# Patient Record
Sex: MALE | Race: WHITE
[De-identification: names, ages, dates, MRNs, and addresses within clinical notes are randomized per-mention and may not be internally consistent; named-entity substitution may affect disease eponyms.]

---

## 2019-09-30 ENCOUNTER — HOSPITAL ENCOUNTER (EMERGENCY)
Dept: HOSPITAL 7 - FB.ED | Age: 29
Discharge: HOME | End: 2019-09-30
Payer: COMMERCIAL

## 2019-09-30 DIAGNOSIS — Z91.040: ICD-10-CM

## 2019-09-30 DIAGNOSIS — L03.113: Primary | ICD-10-CM

## 2019-09-30 PROCEDURE — 87040 BLOOD CULTURE FOR BACTERIA: CPT

## 2019-09-30 PROCEDURE — 80048 BASIC METABOLIC PNL TOTAL CA: CPT

## 2019-09-30 PROCEDURE — 36415 COLL VENOUS BLD VENIPUNCTURE: CPT

## 2019-09-30 PROCEDURE — 85025 COMPLETE CBC W/AUTO DIFF WBC: CPT

## 2019-09-30 PROCEDURE — 99283 EMERGENCY DEPT VISIT LOW MDM: CPT

## 2019-09-30 NOTE — EDM.PDOC
ED HPI GENERAL MEDICAL PROBLEM





- General


Chief Complaint: General


Stated Complaint: POSSIBLE INFECTION


Time Seen by Provider: 09/30/19 20:24


Source of Information: Reports: Patient


History Limitations: Reports: No Limitations





- History of Present Illness


INITIAL COMMENTS - FREE TEXT/NARRATIVE: 





29 y.o.w.m was see in the clinic today where he  had a surgical procedure -boil 

lanced-at his left 2nd and 3rd finger. He was given Bactrim DS in order to 

cover MRSA. Pt noticed this pm. his left hand is swollen and a red streak is 

going up his right arm. FROM of left wrist arm, no Trauma. No N/V/D no SOB or 

any other acute med issues. /80 RR 18 Pulse ox 100% on RA Pulse 82 temp 

36.8


Onset Date: 09/30/19


Onset Time: 07:00


Duration: Hour(s):, Getting Worse


Location: Reports: Upper Extremity, Right


Quality: Reports: Dull


Severity: Mild


Improves with: Reports: Medication


Worsens with: Reports: Movement


Context: Reports: Other (surgical procedure right hand this am)


Associated Symptoms: Reports: Rash (right forearm)


  ** Right index finger


Pain Score (Numeric/FACES): 4





- Related Data


 Allergies











Allergy/AdvReac Type Severity Reaction Status Date / Time


 


Latex, Natural Rubber Allergy  Swelling Verified 09/30/19 19:42











Home Meds: 


 Home Meds





Amoxicillin/Potassium Clav [Augmentin 875-125 Tablet] 1 each PO BID #20 tablet 

09/30/19 [Rx]


Fluticasone/Salmeterol [Advair 250-50 Diskus] 1 puff INH ASDIRECTED 09/30/19 [

History]


Sulfamethoxazole/Trimethoprim [Bactrim Ds Tablet] 1 each PO BID 09/30/19 [

History]











Past Medical History


Psychiatric History: Reports: Anxiety, Depression





- Past Surgical History


Male  Surgical History: Reports: Mastectomy





Social & Family History





- Tobacco Use


Smoking Status *Q: Never Smoker





- Caffeine Use


Caffeine Use: Reports: Energy Drinks





- Recreational Drug Use


Recreational Drug Use: No





ED ROS GENERAL





- Review of Systems


Review Of Systems: See Below


Constitutional: Reports: No Symptoms


HEENT: Reports: No Symptoms


Respiratory: Reports: No Symptoms


Cardiovascular: Reports: No Symptoms


Endocrine: Reports: No Symptoms


GI/Abdominal: Reports: No Symptoms


: Reports: No Symptoms


Musculoskeletal: Reports: No Symptoms


Skin: Reports: Rash (right forearm)


Neurological: Reports: No Symptoms


Psychiatric: Reports: No Symptoms


Hematologic/Lymphatic: Reports: No Symptoms


Immunologic: Reports: No Symptoms





ED EXAM, GENERAL





- Physical Exam


Exam: See Below


Exam Limited By: No Limitations


General Appearance: Alert, WD/WN, Mild Distress


Eye Exam: Bilateral Eye: Normal Inspection


Ears: Normal External Exam


Ear Exam: Bilateral Ear: Auricle Normal


Nose: Normal Inspection, Normal Mucosa, No Blood


Throat/Mouth: Normal Inspection, Normal Lips, Normal Teeth, Normal Voice, No 

Airway Compromise


Head: Atraumatic, Normocephalic


Neck: Normal Inspection, Supple, Non-Tender, Full Range of Motion


Respiratory/Chest: No Respiratory Distress, Lungs Clear, Normal Breath Sounds, 

No Accessory Muscle Use, Chest Non-Tender


Cardiovascular: Normal Peripheral Pulses, Regular Rate, Rhythm, No Edema, No 

Gallop, No JVD, No Murmur, No Rub


Peripheral Pulses: 1+: Carotid (R)


GI/Abdominal: Normal Bowel Sounds, Soft, Non-Tender, No Organomegaly, No 

Abnormal Bruit, No Mass, Pelvis Stable


 (Male) Exam: Deferred


Rectal (Males) Exam: Deferred


Back Exam: Normal Inspection, Full Range of Motion


Extremities: Normal Range of Motion


Neurological: Alert, Oriented, CN II-XII Intact, Normal Cognition


Psychiatric: Normal Affect, Normal Mood


Skin Exam: Warm, Dry, Rash (right forearm)


Lymphatic: No Adenopathy





Course





- Vital Signs


Text/Narrative:: 





29 y.o.w.m was see in the clinic today where he  had a surgical procedure -boil 

lanced-at his left 2nd and 3rd finger. He was given Bactrim DS in order to 

cover MRSA. Pt noticed this pm. his left hand is swollen and a red streak is 

going up his right arm. FROM of left wrist arm, no Trauma. No N/V/D no SOB or 

any other acute med issues. /80 RR 18 Pulse ox 100% on RA Pulse 82 temp 

36.8


PE: WNWD W M with a read streak going up his right forearm


Labs: CBC, BMP all nl. Blood Cx results are pending


Imaging: Not indicated


Impression: Left arm cellulitis


Tx: Augmentin po


Reexam: Improved


Plan: D/C with instructions





Last Recorded V/S: 


 Last Vital Signs











Temp  36.7 C   09/30/19 20:30


 


Pulse  87   09/30/19 20:30


 


Resp  18   09/30/19 20:30


 


BP  132/88   09/30/19 20:30


 


Pulse Ox  95   09/30/19 20:30














- Orders/Labs/Meds


Orders: 


 Active Orders 24 hr











 Category Date Time Status


 


 CULTURE BLOOD [BC] Urgent Lab  09/30/19 20:11 Received


 


 CULTURE BLOOD [BC] Urgent Lab  09/30/19 20:19 Received


 


 Blood Culture x2 Reflex Set [OM.PC] Urgent Oth  09/30/19 19:52 Ordered











Labs: 


 Laboratory Tests











  09/30/19 09/30/19 Range/Units





  20:11 20:11 


 


WBC  11.5   (4.5-12.0)  X10-3/uL


 


RBC  5.26   (4.30-5.75)  x10(6)uL


 


Hgb  16.1   (13.5-17.8)  g/dL


 


Hct  47.0   (30.0-51.3)  %


 


MCV  89.3   (80-96)  fL


 


MCH  30.7   (27.7-33.6)  pg


 


MCHC  34.3   (32.2-35.4)  g/dL


 


RDW  12.7   (11.5-15.5)  %


 


Plt Count  295   (125-369)  X10(3)uL


 


MPV  8.0   (7.4-10.4)  fL


 


Neut % (Auto)  73.5   (46-82)  %


 


Lymph % (Auto)  16.4   (13-37)  %


 


Mono % (Auto)  7.2   (4-12)  %


 


Eos % (Auto)  2   (1.0-5.0)  %


 


Baso % (Auto)  1   (0-2)  %


 


Neut # (Auto)  8.5 H   (1.6-8.3)  #


 


Lymph # (Auto)  1.9   (0.6-5.0)  #


 


Mono # (Auto)  0.8   (0.0-1.3)  #


 


Eos # (Auto)  0.2   (0.0-0.8)  #


 


Baso # (Auto)  0.1   (0.0-0.2)  #


 


Sodium   139  (135-145)  mmol/L


 


Potassium   3.6  (3.5-5.3)  mmol/L


 


Chloride   103  (100-110)  mmol/L


 


Carbon Dioxide   28  (21-32)  mmol/L


 


BUN   9  (7-18)  mg/dL


 


Creatinine   1.0  (0.70-1.30)  mg/dL


 


Est Cr Clr Drug Dosing   107.03  mL/min


 


Estimated GFR (MDRD)   > 60  (>60)  


 


BUN/Creatinine Ratio   9.0  (9-20)  


 


Glucose   105  ()  mg/dL


 


Calcium   8.9  (8.6-10.2)  mg/dL











Meds: 


Medications














Discontinued Medications














Generic Name Dose Route Start Last Admin





  Trade Name Chilo  PRN Reason Stop Dose Admin


 


Amoxicillin/Clavulanate Potassium  1 tab  09/30/19 19:53  09/30/19 20:04





  Augmentin 875 Mg/125 Mg  PO  09/30/19 19:54  1 tab





  ONETIME STA   Administration





     





     





     





     














Departure





- Departure


Time of Disposition: 20:25


Disposition: Home, Self-Care 01


Condition: Good


Clinical Impression: 


Cellulitis


Qualifiers:


 Site of cellulitis of extremity: upper extremity Laterality: right 








- Discharge Information


Prescriptions: 


Amoxicillin/Potassium Clav [Augmentin 875-125 Tablet] 1 each PO BID #20 tablet


Instructions:  Cellulitis, Adult, Easy-to-Read


Referrals: 


Tiki Esposito PA-C [Primary Care Provider] - 


Forms:  ED Department Discharge


Additional Instructions: 


Please cont the Bactrim DS Abx, take Augmentin in addition as prescribed, 

please f/u, come back if your symptoms get worse acutely.





- My Orders


Last 24 Hours: 


My Active Orders





09/30/19 19:52


Blood Culture x2 Reflex Set [OM.PC] Urgent 





09/30/19 20:11


CULTURE BLOOD [BC] Urgent 





09/30/19 20:19


CULTURE BLOOD [BC] Urgent 














- Assessment/Plan


Last 24 Hours: 


My Active Orders





09/30/19 19:52


Blood Culture x2 Reflex Set [OM.PC] Urgent 





09/30/19 20:11


CULTURE BLOOD [BC] Urgent 





09/30/19 20:19


CULTURE BLOOD [BC] Urgent

## 2019-12-25 ENCOUNTER — HOSPITAL ENCOUNTER (EMERGENCY)
Dept: HOSPITAL 7 - FB.ED | Age: 29
Discharge: HOME | End: 2019-12-25
Payer: COMMERCIAL

## 2019-12-25 DIAGNOSIS — Z91.040: ICD-10-CM

## 2019-12-25 DIAGNOSIS — Z79.899: ICD-10-CM

## 2019-12-25 DIAGNOSIS — M54.16: Primary | ICD-10-CM

## 2019-12-25 NOTE — EDM.PDOC
ED HPI GENERAL MEDICAL PROBLEM





- General


Stated Complaint: BACK PAIN


Time Seen by Provider: 12/25/19 01:00


Source of Information: Reports: Patient


History Limitations: Reports: No Limitations





- History of Present Illness


INITIAL COMMENTS - FREE TEXT/NARRATIVE: 





29-year-old male with intermittent problems with left sided lumbar 

radiculopathy for the past 2-3 years. He has had intermittent mild 

exacerbations of this over the past 2-3 years and has done physical therapy and 

chiropractic cares for this with good results. He had an exacerbation of this 

radiculopathy in the beginning of November 2019 and had another exacerbation 

around Thanksgiving time. For the past 2 weeks he has been doing chiropractic 

cares and physical therapy with good progress and control of his pain. However, 

over the past 3 days he has had a worsening of this pain. There was really no 

inciting event other than he turned over in bed and it seemed to worsen after 

this. The pain is a tingling numb pain was sharp and shooting pains down his 

entire left leg. He currently rates the pain as an 8-10/10. He has been unable 

to find a comfortable position or rest secondary to the pain. He has been 

taking ibuprofen and Tylenol for the pain without relief. He has tried 

stretching without relief. He also feels a tightness in his lower back where 

the pain seems to emanate go down his leg. He has had no fevers or chills. He 

has had no bowel or bladder control problems. He reports that he feels that his 

left leg is intermittently weak. He has no abdominal pain. He did have some 

nausea associated with the pain earlier but no vomiting. There are no other 

associated signs or symptoms. There are no other modifying factors.


Onset: Other (Problems for some time worsening over the past 5 days.)


Duration: Getting Worse


Location: Reports: Back, Lower Extremity, Left


Quality: Reports: Burning, Sharp, Other (Tingling)


Severity: Severe


Improves with: Reports: None


Worsens with: Reports: Other (Straight leg raising), Movement


Context: Reports: Other (As above)


Associated Symptoms: Reports: No Other Symptoms (Except as above)


Treatments PTA: Reports: Acetaminophen, NSAIDS





- Related Data


 Allergies











Allergy/AdvReac Type Severity Reaction Status Date / Time


 


Latex, Natural Rubber Allergy  Swelling Verified 12/25/19 01:16











Home Meds: 


 Home Meds





Amoxicillin/Potassium Clav [Augmentin 875-125 Tablet] 1 each PO BID #20 tablet 

09/30/19 [Rx]


Fluticasone/Salmeterol [Advair 250-50 Diskus] 1 puff INH ASDIRECTED 09/30/19 [

History]


Sulfamethoxazole/Trimethoprim [Bactrim Ds Tablet] 1 each PO BID 09/30/19 [

History]


Albuterol Sulfate [Proair Hfa]  12/25/19 [History]


Cyclobenzaprine HCl  12/25/19 [History]


FLUoxetine HCl [Fluoxetine HCl]  12/25/19 [History]


Fluticasone/Salmeterol [Advair 250-50 Diskus]  12/25/19 [History]


Hydrocodone/Acetaminophen [Norco 5-325 Tablet] 1 - 2 tab PO Q6H PRN #6 tablet 12 /25/19 [Rx]


methylPREDNISolone [Medrol Dose Pack] 4 mg PO ASDIRECTED #1 dospk 12/25/19 [Rx]


traZODone HCl [Trazodone HCl] 100 mg PO BEDTIME 12/25/19 [History]











Past Medical History


Musculoskeletal History: Reports: Back Pain, Chronic


Neurological History: Reports: Other (See Below) (Left lumbar radiculopathy)


Psychiatric History: Reports: Anxiety, Depression





- Past Surgical History


Male  Surgical History: Reports: Mastectomy (Bilateral for gynecomastia)





Social & Family History





- Tobacco Use


Smoking Status *Q: Unknown Ever Smoked (Unknown)





- Caffeine Use


Caffeine Use: Reports: Energy Drinks





- Alcohol Use


Alcohol Use History: Yes


Alcohol Use Frequency: Weekly





- Living Situation & Occupation


Occupation: Employed





ED ROS GENERAL





- Review of Systems


Review Of Systems: See Below


Constitutional: Reports: No Symptoms


HEENT: Reports: No Symptoms


Respiratory: Reports: No Symptoms


Cardiovascular: Reports: No Symptoms


Endocrine: Reports: No Symptoms


GI/Abdominal: Reports: Nausea (With the pain)


: Reports: No Symptoms


Musculoskeletal: Reports: Back Pain, Leg Pain


Skin: Reports: No Symptoms


Neurological: Reports: Other (Left lumbar radiculopathy)


Hematologic/Lymphatic: Reports: No Symptoms


Immunologic: Reports: No Symptoms





ED EXAM,LOWER BACK PAIN/INJURY





- Physical Exam


Exam: See Below


Exam Limited By: No Limitations


General Appearance: Alert, WD/WN, Moderate Distress


Eye Exam: Bilateral Eye: EOMI, Normal Inspection, PERRL


Ears: Normal External Exam, Hearing Grossly Normal


Nose: Normal Inspection, Normal Mucosa, No Blood


Throat/Mouth: Normal Inspection, Normal Lips, Normal Oropharynx, Normal Voice, 

No Airway Compromise


Head: Atraumatic, Normocephalic


Neck: Normal Inspection, Supple, Non-Tender, Full Range of Motion


Respiratory/Chest: No Respiratory Distress, Lungs Clear, Normal Breath Sounds, 

No Accessory Muscle Use, Chest Non-Tender


Cardiovascular: Normal Peripheral Pulses, Regular Rate, Rhythm, No Murmur


GI/Abdominal: Normal Bowel Sounds, Soft, Non-Tender, No Mass


Back Exam: Muscle Spasm (Left lower back), Paraspinal Tenderness


Extremities: Normal Inspection, Normal Range of Motion, Non-Tender, No Pedal 

Edema, Normal Capillary Refill


Neurological: Alert, Normal Dorsiflexion, CN II-XII Intact, Normal Plantar 

Flexion, No Motor/Sensory Deficits, Oriented x 3, Straight Leg Raise (L)


Skin Exam: Warm, Dry, Intact, Normal Color, No Rash





Course





- Orders/Labs/Meds


Meds: 


Medications














Discontinued Medications














Generic Name Dose Route Start Last Admin





  Trade Name Chilo  PRN Reason Stop Dose Admin


 


Diazepam  5 mg  12/25/19 01:19  





  Valium  IM  12/25/19 01:20  





  ONETIME ONE   





     





     





     





     


 


Ketorolac Tromethamine  60 mg  12/25/19 01:19  





  Toradol  IM  12/25/19 01:20  





  ONETIME ONE   





     





     





     





     


 


Prednisone  60 mg  12/25/19 01:19  12/25/19 01:36





  Prednisone  PO  12/25/19 01:20  60 mg





  ONETIME ONE   Administration





     





     





     





     














- Re-Assessments/Exams


Free Text/Narrative Re-Assessment/Exam: 





12/25/19 01:20: Patient with exacerbation of left lumbar radiculopathy. He was 

given Toradol 60 mg and Valium 10 mg IM. He was also given prednisone 60 mg by 

mouth. He was also given a take-home packs of hydrocodone 5/325 and Flexeril 10 

milligrams. I will give him a further prescription of 6 more hydrocodone and 

Medrol dose pack. He is to follow-up with his primary doctor in regard to this. 

Precautions and reasons for return to the emergency department were discussed 

with the patient prior to his discharge.








Departure





- Departure


Time of Disposition: 01:30


Disposition: Home, Self-Care 01


Condition: Good


Clinical Impression: 


 Left lumbar radiculopathy, Lumbar back pain with radiculopathy affecting left 

lower extremity








- Discharge Information


Prescriptions: 


Hydrocodone/Acetaminophen [Norco 5-325 Tablet] 1 - 2 tab PO Q6H PRN #6 tablet


 PRN Reason: Moderate to severe pain


methylPREDNISolone [Medrol Dose Pack] 4 mg PO ASDIRECTED #1 dospk


Instructions:  Back Exercises, Easy-to-Read, Lumbosacral Radiculopathy


Referrals: 


Tiki Esposito PA-C [Primary Care Provider] - 


Additional Instructions: 


You appear to have a lumbar radiculopathy or pain in your back and down your 

left leg related to a herniated disc in your lower back disc with compression 

on a nerve going down your left leg. Avoid any strenuous activity. Ambulate as 

tolerated. You may continue to take ibuprofen 600-800 mg by mouth every 6-8 

hours as needed for pain. Medication as prescribed (hydrocodone 5/325, Flexeril 

10 mg, Medrol Dosepak). Follow-up with your primary provider this week as you 

most probably will need further outpatient testing through your primary 

provider. Back to the emergency department for inability to urinate, bowel 

control problems, leg weakness, fever or any other concerning sign or symptom.





Sepsis Event Note





- Focused Exam


Date Exam was Performed: 12/25/19


Time Exam was Performed: 01:38